# Patient Record
Sex: FEMALE | Race: WHITE | URBAN - METROPOLITAN AREA
[De-identification: names, ages, dates, MRNs, and addresses within clinical notes are randomized per-mention and may not be internally consistent; named-entity substitution may affect disease eponyms.]

---

## 2022-02-14 ENCOUNTER — OFFICE VISIT (OUTPATIENT)
Dept: URBAN - METROPOLITAN AREA CLINIC 30 | Facility: CLINIC | Age: 52
End: 2022-02-14
Payer: COMMERCIAL

## 2022-02-14 DIAGNOSIS — H04.122 TEAR FILM INSUFFICIENCY OF LEFT LACRIMAL GLAND: Primary | ICD-10-CM

## 2022-02-14 DIAGNOSIS — E11.9 TYPE 2 DIABETES MELLITUS W/O COMPLICATION: ICD-10-CM

## 2022-02-14 PROCEDURE — 99203 OFFICE O/P NEW LOW 30 MIN: CPT | Performed by: OPTOMETRIST

## 2022-02-14 ASSESSMENT — INTRAOCULAR PRESSURE
OS: 16
OD: 17

## 2022-02-14 NOTE — IMPRESSION/PLAN
Impression: Tear film insufficiency of left lacrimal gland: H04.122. Plan: Likely K abrasion that has resolved. Trauma to OS 3 days ago. Using yakelin during the day noting comfort. Rec regular use of ATs BID-QID OU. Pt assured no signs of inflammation. EOMs full, no diplopia, no signs of orbital fracture. S/sx iritis reviewed. Pt will contact office if experiences.

## 2022-02-14 NOTE — IMPRESSION/PLAN
Impression: Type 2 diabetes mellitus w/o complication: V68.7. Plan: No retinopathy on exam today. Patient educated on importance of BG control. Monitor annually. Diet controlled.